# Patient Record
Sex: FEMALE | Race: WHITE | NOT HISPANIC OR LATINO | URBAN - METROPOLITAN AREA
[De-identification: names, ages, dates, MRNs, and addresses within clinical notes are randomized per-mention and may not be internally consistent; named-entity substitution may affect disease eponyms.]

---

## 2017-03-01 ENCOUNTER — AMBULATORY - HEALTHEAST (OUTPATIENT)
Dept: FAMILY MEDICINE | Facility: CLINIC | Age: 27
End: 2017-03-01

## 2017-03-01 DIAGNOSIS — R87.619 ABNORMAL PAP SMEAR OF CERVIX: ICD-10-CM

## 2017-03-01 DIAGNOSIS — B97.7 HIGH RISK HPV INFECTION: ICD-10-CM

## 2017-03-01 ASSESSMENT — MIFFLIN-ST. JEOR: SCORE: 1370.45

## 2017-03-06 LAB
LAB AP CHARGES (HE HISTORICAL CONVERSION): NORMAL
PATH REPORT.COMMENTS IMP SPEC: NORMAL
PATH REPORT.COMMENTS IMP SPEC: NORMAL
PATH REPORT.FINAL DX SPEC: NORMAL
PATH REPORT.GROSS SPEC: NORMAL
PATH REPORT.MICROSCOPIC SPEC OTHER STN: NORMAL
PATH REPORT.RELEVANT HX SPEC: NORMAL
RESULT FLAG (HE HISTORICAL CONVERSION): NORMAL

## 2018-07-19 ENCOUNTER — OFFICE VISIT - HEALTHEAST (OUTPATIENT)
Dept: FAMILY MEDICINE | Facility: CLINIC | Age: 28
End: 2018-07-19

## 2018-07-19 DIAGNOSIS — L73.2 HIDRADENITIS: ICD-10-CM

## 2018-07-19 DIAGNOSIS — Z01.818 PREOPERATIVE GENERAL PHYSICAL EXAMINATION: ICD-10-CM

## 2018-07-19 DIAGNOSIS — L73.2 HIDRADENITIS SUPPURATIVA: ICD-10-CM

## 2018-07-19 LAB
HCG UR QL: NEGATIVE
HGB BLD-MCNC: 13.5 G/DL (ref 12–16)
SP GR UR STRIP: 1.01 (ref 1–1.03)

## 2018-07-19 ASSESSMENT — MIFFLIN-ST. JEOR: SCORE: 1381.79

## 2019-06-05 ENCOUNTER — OFFICE VISIT - HEALTHEAST (OUTPATIENT)
Dept: MIDWIFE SERVICES | Facility: CLINIC | Age: 29
End: 2019-06-05

## 2019-06-05 DIAGNOSIS — N92.0 MENORRHAGIA WITH REGULAR CYCLE: ICD-10-CM

## 2019-06-05 DIAGNOSIS — N94.6 DYSMENORRHEA: ICD-10-CM

## 2019-06-05 ASSESSMENT — MIFFLIN-ST. JEOR: SCORE: 1368.18

## 2019-08-02 ENCOUNTER — COMMUNICATION - HEALTHEAST (OUTPATIENT)
Dept: FAMILY MEDICINE | Facility: CLINIC | Age: 29
End: 2019-08-02

## 2021-05-29 NOTE — PROGRESS NOTES
Assessment:        1. Dysmenorrhea  US Pelvis Non OB    US Pelvis Non OB   2. Menorrhagia with regular cycle  US Pelvis Non OB    US Pelvis Non OB         Plan:      Discussed the diagnosis with the patient:    I did order an ultrasound for her reassurance.  After discussing all the ultrasound is performed she ask how necessary I thought the ultrasound was.  I did explain that I ordered it mostly for her reassurance.  I do not feel any other labs are indicated at this time.  I also explained that I felt that she can manage her symptoms with NSAIDs/ibuprofen 400 to 600 mg every 4-6 hours at the onset of symptoms x3 days.  She does not feel she needs these for 3 days, typically, 1 day is adequate.  I reassured her that she can do this safely.    I discussed that we could consider more labs if she was actively trying to get pregnant and had not gotten pregnant within 1 year of trying.    Discussed non-pharmaceutical approaches to the problem:    She is already working with a natural path and taking many supplements.  These include probiotic, vitamin with folic acid, and others that she did not list.  She also drinks minimal alcohol, and does not smoke.  Discussed avoiding excess caffeine.    Pelvic ultrasound -patient will discuss with her  and consider.      Follow up as needed.     TOMI Perez CNM  6/5/2019  5:05 PM    Total time spent with patient 30 minutes, >50% counseling, education and coordination of care.        Subjective:      Roberta Garay is a 28 y.o. female who presents for evaluation of menstrual symptoms. Symptoms began 2 years ago.  She went up her control pills 3 years ago, and then noted gradual increase of symptoms over the last 2 years.  They have gotten worse in the last couple months.  Patient describes symptoms of labile mood (moderate), menorrhagia (increases in first 24 hours of period - changes Diva cup q 12 hours that first 24 hours (30 ml full both times), then it  "gradually lessens) and menstrual cramping (moderate to severe). Symptoms occur with periods, which are usually 28 days apart and quite regular.  Evaluation to date includes: none. Treatment to date includes: OTC NSAIDs (effective). The patient is sexually active.      She states that her and her  have been using the withdrawal method, but have been \"a little bit more sloppy\" in the last couple months.  Roberta has been also tracking her cycles, though not draining as much now as she feels that she is gotten all her body quite well.  They have decided not to try to conceive for another year.  She does note that her symptoms prior to being on birth control, were largely nonexistent.  She did not have periodic cramps prior to being on birth control.  However, she has noticed this gradual increase of painful cramps and some heavier bleeding.  Periods do only last 4 days.    She is worried and wants to \"make sure that everything is normal.\"    Last Pap smear was at [facility] on 4/30/2019 and was normal.  She states that she has had 2 normal Pap smears since her abnormal ones.    The following portions of the patient's history were reviewed and updated as appropriate: allergies, current medications, problem list, past family history, past medical history, past social history, past surgical history and problem list    Review of Systems  General:  See HPI  Eyes: Denies problem  Ears/Nose/Throat: Denies problem  Cardiovascular: Denies problem  Respiratory:  Denies problem  Gastrointestinal:  Denies problem,   Genitourinary: See HPI  Musculoskeletal:  Denies problem  Skin: Has known skin disorder  Neurologic: Denies problem  Psychiatric: Denies problem  Endocrine: Denies problem  Heme/Lymphatic: Denies problem   Allergic/Immunologic: Denies problem     Objective:        Vitals:    06/05/19 1619   BP: 102/64   Pulse: 78   SpO2: 99%   Weight: 144 lb (65.3 kg)   Height: 5' 4\" (1.626 m)       Physical Exam:  General " Appearance: Alert, cooperative, no distress, appears stated age  Head: Normocephalic, without obvious abnormality, atraumatic  Eyes: PERRL, conjunctiva/corneas clear  Nose: Nares normal, mucosa normal, no drainage  Lungs: respirations unlabored    Extremities: Extremities normal, atraumatic, no cyanosis or edema  Skin: Skin color, texture, turgor normal, no rashes or lesions, previous scarring and some lesions noted  Neurologic: Normal

## 2021-05-30 VITALS — HEIGHT: 64 IN | WEIGHT: 144.5 LBS | BODY MASS INDEX: 24.67 KG/M2

## 2021-05-31 NOTE — TELEPHONE ENCOUNTER
report indicate that the patient needs Physical writer intends to contact the patient to assist in scheduling appointment.   I spoke with patient  recommended physical exam but she declines at this time.

## 2021-06-01 VITALS — BODY MASS INDEX: 25.1 KG/M2 | WEIGHT: 147 LBS | HEIGHT: 64 IN

## 2021-06-03 VITALS — BODY MASS INDEX: 24.59 KG/M2 | HEIGHT: 64 IN | WEIGHT: 144 LBS

## 2021-06-09 NOTE — PROGRESS NOTES
Assessment/Plan:        Diagnoses and all orders for this visit:    Abnormal Pap smear of cervix  -     Pregnancy, Urine  -     Surgical Pathology Exam    High risk HPV infection-the patient did not know until this appointment that she has high risk HPV.  I did review her all her Pap smear results with her.  She had an abnormal Pap with high-risk HPV in 2014, an abnormal Pap without high risk HPV in 2015, and another abnormal Pap with high risk HPV in 2016.  I spent 20 minutes with her today educating her about HPV.  She was very upset that she hasn't been told she has HPV.  We discussed what she can tell her boyfriend.  I discussed with her that there is no way of knowing when she got it and that in the majority of women, and the body's immune system well fight off this infection.  I will let her know as soon as I have the results of the biopsies.  I explained to her that she will most likely need another Pap and HPV testing in a year instead of in 3 years as is usual for her age group.  I also answered her questions regarding whether or not HPV infection affects her ability to get pregnant or have children.  I explained to her that the best way to prevent transmission to her boyfriend is with condoms.  She will let us know if she has any further questions or concerns.          Subjective:    Patient ID: Roberta Mcdermott is a 26 y.o. female.    HPI:  Patient is here today for a colposcopy.  She has had 3 abnormal Pap smears.  She has never had a colposcopy before.  She has been doing reading about abnormal Pap smears and from what she is red, it appears that this is usually caused from HPV.  She is confused about why she has had normal Pap smears because she has never been diagnosed with HPV.  She had her first sexual activity at age 20.  She has been with her current male partner for 3 years and she has been monogamous with him.    She isn't been pregnant.     The following portions of the patient's history were  reviewed and updated as appropriate: allergies, current medications, past family history, past medical history, past social history, past surgical history and problem list.    Review of Systems      10 system review negative    Objective:    Physical Exam       Patient is in no apparent physical distress.  Vitals are as recorded.  Head and face are normal.  Conjunctiva are clear.  resp rate and effort normal.   Extremities are without edema.  Gait is normal.  Skin is without rashes.  Mood and affect are appropriate.She is appropriately upset when she found out that she is HPV and no one has ever told her that.    Procedure note: After all the patient's questions were answered and the consent form was signed, she was placed in dorsal lithotomy position on the exam table.  Sterile speculum was inserted.  The patient's cervix was examined through the colposcope.  Acetic acid was then applied to the cervix.  She had acetowhite staining from 8:00 to 3:00 in a thin layer around the os.  From 10:00 to 3:00 there was a thicker layer of acetowhite staining.  The SCJ was visualized in its entirety, cervix nulliparous.   Vaginal mucosa appeared normal.  2 biopsies of the cervix were done.  Monsel's solution was applied and good hemostasis was achieved.  Patient tolerated the procedure well with no complications.

## 2021-06-16 PROBLEM — L73.2 HIDRADENITIS SUPPURATIVA: Status: ACTIVE | Noted: 2018-07-19

## 2021-06-19 NOTE — PROGRESS NOTES
Preoperative Exam    Scheduled Procedure: Hidradenitis Chest Excision  Surgery Date:  08/15/2018  Surgery Location: [facility] (fax # [phone])    Surgeon: Constance Mac MD    Assessment/Plan:     Roberta was seen today for establish care and pre-op exam.    Diagnoses and all orders for this visit:    Preoperative general physical examination:   -     Pregnancy, Urine  -     Hemoglobin    Hidradenitis suppurativa: Has tried multiple treatments and seen multiple specialists, including dermatology. Planning for incision and removal of hidradenitis lesions on central chest.     Surgical Procedure Risk: Low (reported cardiac risk generally < 1%)  Have you had prior anesthesia?: Yes  Have you or any family members had a previous anesthesia reaction:  No  Do you or any family members have a history of a clotting or bleeding disorder?: No  Cardiac Risk Assessment: no increased risk for major cardiac complications    Patient approved for surgery with general or local anesthesia.    Functional Status: Independent  Patient plans to recover at parent's home     Subjective:      Roberta Garay is a 27 y.o. female who presents for a preoperative consultation.      Hidraadenitis suppurativa: on chest. Has tried accutane, antibiotics, local incision and drainage, and changing her diet. Has had this for 5 years on and off. Saw dermatologist. Planning for removal of lesions under anesthesia with Dr. Mac- breast surgeon.    All other systems reviewed and are negative, other than those listed in the HPI.    Pertinent History  Do you have difficulty breathing or chest pain after walking up a flight of stairs: No  History of obstructive sleep apnea: No  Steroid use in the last 6 months: No  Frequent Aspirin/NSAID use: No  Prior Blood Transfusion: No  Prior Blood Transfusion Reaction: N/A  If for some reason prior to, during or after the procedure, if it is medically indicated, would you be willing to have a blood  "transfusion?:  There is no transfusion refusal.    No current outpatient prescriptions on file.     No current facility-administered medications for this visit.       No Known Allergies    Patient Active Problem List   Diagnosis     Acne     Methicillin Resistant Staphylococcus Aureus Infection     Hidradenitis suppurativa       Past Medical History:   Diagnosis Date     History of MRSA infection        Past Surgical History:   Procedure Laterality Date     CLAVICLE HARDWARE REMOVAL       CLAVICLE SURGERY       [place] DENTAL SURGERY PROCEDURE      Description: Oral Surgery Tooth Extraction;  Recorded: 06/11/2013;     [place] OPEN TREATMENT CLAVICULAR FRACTURE INTERNAL FX      Description: Open Treatment Of Clavicular Fracture;  Recorded: 06/11/2013;       Social History     Social History     Marital status:      Spouse name: N/A     Number of children: N/A     Years of education: N/A     Occupational History     Not on file.     Social History Main Topics     Smoking status: Former Smoker     Quit date: 2013     Smokeless tobacco: Never Used     Alcohol use Yes      Comment: very rarely     Drug use: No     Sexual activity: Yes     Partners: Male     Birth control/ protection: None     Other Topics Concern     Not on file     Social History Narrative       Patient Care Team:  Kevin Ugarte MD as PCP - General (Family Medicine)      Objective:     Vitals:    07/19/18 1515   BP: 104/68   Pulse: 64   Resp: 16   Temp: 98.3  F (36.8  C)   TempSrc: Oral   Weight: 147 lb (66.7 kg)   Height: 5' 4\" (1.626 m)   LMP: 06/22/2018     Physical Exam:  General: Alert and oriented, in NAD.  Eyes: PERRL, EOMI, sclera normal.  HENT: Normocephalic, no pharyngeal erythema, MMM.  Neck: Supple, no adenopathy.  Heart: Normal S1 and S2, regular rhythm. No murmurs, gallops, rubs.  Lungs: CTA bilaterally, no wheezes, no crackles, no rhonchi.  Abdomen: Soft, non-tender, non-distended, BS+.   MSK: Normal ROM of upper and lower extremities.  Neuro: " Moves all extremities. No focal deficits noted.  Extremities: Peripheral pulses intact, no peripheral edema.  Skin: Warm and well perfused, no rashes noted. Multiple cystic and nodular lesions across mid chest, draining.   Psych: Normal mood and affect.      Patient Instructions     Hold all supplements, aspirin and NSAIDs for 7 days prior to surgery.  Follow your surgeon's direction on when to stop eating and drinking prior to surgery.  Your surgeon will be managing your pain after your surgery.    Remove all jewelry and metal piercings before your surgery.   Remove nail polish from fingers before surgery.        Labs:  Recent Results (from the past 24 hour(s))   Pregnancy, Urine    Collection Time: 07/19/18  3:46 PM   Result Value Ref Range    Pregnancy Test, Urine Negative Negative    Specific Gravity, UA 1.015 1.001 - 1.030   Hemoglobin    Collection Time: 07/19/18  3:46 PM   Result Value Ref Range    Hemoglobin 13.5 12.0 - 16.0 g/dL         Electronically signed by Kirstie Salter MD 07/19/18 3:18 PM